# Patient Record
Sex: FEMALE | Race: OTHER | Employment: FULL TIME | URBAN - METROPOLITAN AREA
[De-identification: names, ages, dates, MRNs, and addresses within clinical notes are randomized per-mention and may not be internally consistent; named-entity substitution may affect disease eponyms.]

---

## 2021-01-08 NOTE — PROGRESS NOTES
[address] [facility] [place] 
45 y.o. female 1980 
[address] Radha Figueroa [place] [address] 
[id number] 
 
[phone] (home) [address]:   
Telephone Encounter Laura Lindquist MD 
  
 
Encounter Date: 1/12/2021 at 9:15 AM 
 
Consent: Waldo Roque, who was seen by synchronous (real-time) audio only technology, and/or her healthcare decision maker, is aware that this patient-initiated, Telehealth encounter on 1/12/2021 is a billable service, with coverage as determined by her insurance carrier. She is aware that she may receive a bill and has provided verbal consent to proceed: Yes. Chief Complaint Patient presents with  Missed Menses [address] History of Present Illness Waldo Roque is a 45 y.o. female was evaluated by telephone. I communicated with the patient and/or health care decision maker about :  
 
Missed Menses Patient had her LMP on 10/25/2020. Regular cycles. UPT 11/30/2020. She had 2-3 days of vaginal bleeding that seems like a regular menstrual cycle on 12/16/2020. She took another pregnancy test 12/20/2020. Did not seek medical attention. This is her 2nd pregnancy. G1:  Vaginal delivery at hospital in [address] [place] [address] in 2006, 39 wks, male infant, 6lbs 11oz - PNV: Yes 
- Denies any issues with diabetes or hypertension Establish Care- last seen in 2016 
- HLD: was taking a medication and stopped taking once she found out she was pregnant - Never had mammogram before - [facility]: [facility] Normal except for +syphilis 
- History of Syphilis - treated with antibiotic injection (does not remember the name). Neg CHRIS. Partner also treated. FHx: Denies any FHx of Diabetes or HTN. Paternal aunt: breast cancer at 44yo. COVID: 
- Denies exposure to anyone with COVID  
- No sick contacts - Denies any URI or GI symptoms Review of Systems Review of Systems Constitutional: Negative for chills and fever. HENT: Negative for congestion and sore throat. Eyes: Negative for discharge and redness. Respiratory: Negative for cough and wheezing. Cardiovascular: Negative for chest pain and palpitations. Gastrointestinal: Negative for abdominal pain, nausea and vomiting. Vitals/Objective:  
General: Patient speaking in complete sentences without effort. Normal speech and cooperative. Due to this being a Virtual Check-in/Telephone evaluation, many elements of the physical examination are unable to be assessed. Assessment and Plan: 1. Encounter to establish care Updated PCP 2. Missed menses LMP 10/25/2020, today pt is 11w2d with PAM 8/1/2021. However, had 2-3 days of \"normal flow\" menstrual cycle. UPT + on 12/20/2020. Discussed that it could be a miscarriage. Recommend taking another UPT today. Will call patient back later today to see the result- if still positive will have patient come to the clinic for initial OB at ~12wks. Continue PNV. 3. History of syphilis Treated at the [address] [place] [address] with neg CHRIS. Partner treated. Same partner now. 4. Family history of breast cancer in female Never had a mammogram. Reminded patient to discuss risks/benefits of mammogram after pregnancy. 5. Mixed hyperlipidemia Does not remember name, agree with DC as it is likely a statin and is CI in pregnancy. Time spent: 21-30 minutes We discussed the expected course, resolution and complications of the diagnosis(es) in detail. Medication risks, benefits, costs, interactions, and alternatives were discussed as indicated. I advised her to contact the office if her condition worsens, changes or fails to improve as anticipated. She expressed understanding with the diagnosis(es) and plan. Patient understands that this encounter was a temporary measure, and the importance of further follow up and examination was emphasized. Patient verbalized understanding. Patient informed to follow up: TBD I affirm this is a Patient Initiated Episode with an Established Patient who has not had a related appointment within my department in the past 7 days or scheduled within the next 24 hours. Note: not billable if this call serves to triage the patient into an appointment for the relevant concern Electronically Signed: Aaliyah Castillo MD 
Providers location when delivering service: home CPT: 
36022 (5-10 minutes) 59903 (11-20 minutes) 36239 (21-30 minutes) Medicare: 
 - Virtual Check-in ICD-10-CM ICD-9-CM 1. Missed menses  N92.6 626.4 2. Encounter to establish care  Z76.89 V65.8 3. History of syphilis  Z86.19 V12.09   
4. Family history of breast cancer in female  Z80.3 V15.2 Pursuant to the emergency declaration under the [facility], 1135 waiver authority and the [employer] Act, this Virtual  Visit was conducted, with patient's consent, to reduce the patient's risk of exposure to COVID-19 and provide continuity of care for an established patient. History Patients past medical, surgical and family histories were personally reviewed and updated. Past Medical History:  
Diagnosis Date  History of syphilis 1/12/2021 Dx 2017, treated at the [address] [place] [address] per patient, no records to review  Implanon in place Past Surgical History:  
Procedure Laterality Date  HX APPENDECTOMY Family History Problem Relation Age of Onset  No Known Problems Mother  No Known Problems Father  No Known Problems Maternal Grandmother  No Known Problems Maternal Grandfather  No Known Problems Paternal Grandmother  No Known Problems Paternal Grandfather Social History Socioeconomic History  Marital status:  Spouse name: Not on file  Number of children: Not on file  Years of education: Not on file  Highest education level: Not on file Occupational History  Not on file Social Needs  Financial resource strain: Not on file  Food insecurity Worry: Not on file Inability: Not on file  Transportation needs Medical: Not on file Non-medical: Not on file Tobacco Use  Smoking status: Never Smoker Substance and Sexual Activity  Alcohol use: No  
 Drug use: No  
 Sexual activity: Yes  
  Partners: Male Birth control/protection: None Lifestyle  Physical activity Days per week: Not on file Minutes per session: Not on file  Stress: Not on file Relationships  Social connections Talks on phone: Not on file Gets together: Not on file Attends [employer] service: Not on file Active member of club or organization: Not on file Attends meetings of clubs or organizations: Not on file Relationship status: Not on file  Intimate partner violence Fear of current or ex partner: Not on file Emotionally abused: Not on file Physically abused: Not on file Forced sexual activity: Not on file Other Topics Concern  Not on file Social History Narrative  Not on file Current Medications/Allergies Medications and Allergies reviewed: 
 
 
Not on File

## 2021-01-12 ENCOUNTER — VIRTUAL VISIT (OUTPATIENT)
Dept: FAMILY MEDICINE CLINIC | Age: 41
End: 2021-01-12

## 2021-01-12 DIAGNOSIS — Z76.89 ENCOUNTER TO ESTABLISH CARE: ICD-10-CM

## 2021-01-12 DIAGNOSIS — E78.2 MIXED HYPERLIPIDEMIA: ICD-10-CM

## 2021-01-12 DIAGNOSIS — Z86.19 HISTORY OF SYPHILIS: ICD-10-CM

## 2021-01-12 DIAGNOSIS — Z80.3 FAMILY HISTORY OF BREAST CANCER IN FEMALE: ICD-10-CM

## 2021-01-12 DIAGNOSIS — N92.6 MISSED MENSES: Primary | ICD-10-CM

## 2021-01-12 PROCEDURE — 99443 PR PHYS/QHP TELEPHONE EVALUATION 21-30 MIN: CPT | Performed by: STUDENT IN AN ORGANIZED HEALTH CARE EDUCATION/TRAINING PROGRAM

## 2021-01-12 NOTE — PROGRESS NOTES
[address] Medicine Residency Attending Addendum:  Dr. Misha Huerta MD,  the patient and I were not physically present during this encounter. The resident and I are concurrently monitoring the patient care through appropriate telecommunication technology. I discussed the findings, assessment and plan with the resident and agree with the resident's findings and plan as documented in the resident's note.       Shira Mares MD